# Patient Record
Sex: FEMALE | Race: WHITE | Employment: FULL TIME | URBAN - METROPOLITAN AREA
[De-identification: names, ages, dates, MRNs, and addresses within clinical notes are randomized per-mention and may not be internally consistent; named-entity substitution may affect disease eponyms.]

---

## 2022-07-13 RX ORDER — LEVONORGESTREL AND ETHINYL ESTRADIOL 0.1-0.02MG
KIT ORAL
Qty: 112 TABLET | OUTPATIENT
Start: 2022-07-13

## 2022-09-26 RX ORDER — LEVONORGESTREL AND ETHINYL ESTRADIOL 0.1-0.02MG
KIT ORAL
Qty: 84 TABLET | OUTPATIENT
Start: 2022-09-26

## 2022-09-26 RX ORDER — LEVONORGESTREL AND ETHINYL ESTRADIOL 0.1-0.02MG
1 KIT ORAL DAILY
Qty: 1 PACKET | Refills: 1 | Status: SHIPPED | OUTPATIENT
Start: 2022-09-26

## 2022-11-08 NOTE — PROGRESS NOTES
Patient presents today for a routine gynecological examination with no complaints. Has been on continuous cycle OCP x1 year and notes ever since starting alesse has been having a monthly cycle, but notes is never the same time of the month. Has no predictability whatsoever and has not yet had ceasing of cycle Would like alternate OCP if possible. Denies heavy bleeding. Denies significant pain. Denies pain/bleeding with intercourse. OB History          0    Para        Term   0       0    AB   0    Living   0         SAB        IAB        Ectopic        Molar        Multiple        Live Births                      GYN History     Last pap: 21 Neg/HPV Neg      Patient's last menstrual period was 10/01/2022 (exact date). Continuous OCP x 1 year, periods are random but every month.   positive dysmenorrhea; trace positive postcoital bleeding    Past Medical History:  Past Medical History:   Diagnosis Date    Uterine fibroid        Past Surgical History:  History reviewed. No pertinent surgical history. Allergies:   No Known Allergies    Medication History:  Current Outpatient Medications   Medication Sig Dispense Refill    norgestimate-ethinyl estradiol (ORTHO-CYCLEN, 28,) 0.25-35 MG-MCG per tablet Take 1 tablet by mouth daily Continuous cycle 4 packet 3    levonorgestrel-ethinyl estradiol (AVIANE;ALESSE;LESSINA) 0.1-20 MG-MCG per tablet Take 1 tablet by mouth daily 1 packet 1     No current facility-administered medications for this visit.        Social History:  Social History     Socioeconomic History    Marital status: Single     Spouse name: Not on file    Number of children: Not on file    Years of education: Not on file    Highest education level: Not on file   Occupational History    Not on file   Tobacco Use    Smoking status: Never    Smokeless tobacco: Never   Substance and Sexual Activity    Alcohol use: Yes    Drug use: Never    Sexual activity: Yes     Partners: Male     Birth control/protection: Pill   Other Topics Concern    Not on file   Social History Narrative    Not on file     Social Determinants of Health     Financial Resource Strain: Not on file   Food Insecurity: Not on file   Transportation Needs: Not on file   Physical Activity: Not on file   Stress: Not on file   Social Connections: Not on file   Intimate Partner Violence: Not on file   Housing Stability: Not on file       Family History:  No family history on file. Review of Systems - General ROS: negative except for that discussed in HPI      ROS:  Feeling well. No dyspnea or chest pain on exertion. No abdominal pain, change in bowel habits, black or bloody stools. No urinary tract symptoms. No neurological complaints. Objective:   /76   Ht 5' 8\" (1.727 m)   Wt 215 lb (97.5 kg)   LMP 10/01/2022 (Exact Date)   BMI 32.69 kg/m²   The patient appears well, alert, oriented x 3, in no distress. ENT normal.  Neck supple. No adenopathy or thyromegaly. Lungs:  clear, good air entry, no wheezes, rhonchi or rales. Heart:  S1 and S2 normal, no murmurs, regular rate and rhythm. Abdomen:  soft without tenderness, guarding, mass or organomegaly. Extremities show no edema, normal peripheral pulses. Neurological is normal, no focal findings. BREAST EXAM: breasts appear normal, no suspicious masses, no skin or nipple changes or axillary nodes, symmetric fibrous changes bilaterally    PELVIC EXAM: VULVA: normal appearing vulva with no masses, tenderness or lesions, VAGINA: normal appearing vagina with normal color and discharge, no lesions, CERVIX: normal appearing cervix without discharge or lesions, UTERUS: uterus is normal size, shape, consistency and nontender, ADNEXA: normal adnexa in size, nontender and no masses    Assessment/Plan:     1. Well woman exam    - AMB POC URINALYSIS DIP STICK AUTO W/O MICRO  - norgestimate-ethinyl estradiol (ORTHO-CYCLEN, 28,) 0.25-35 MG-MCG per tablet;  Take 1 tablet by mouth daily Continuous cycle  Dispense: 4 packet; Refill: 3    2. Screening for genitourinary condition    - AMB POC URINALYSIS DIP STICK AUTO W/O MICRO    3. Irregular menses    - norgestimate-ethinyl estradiol (ORTHO-CYCLEN, 28,) 0.25-35 MG-MCG per tablet; Take 1 tablet by mouth daily Continuous cycle  Dispense: 4 packet; Refill: 3     No pap smear per guidelines   Alesse to sprintec- discussed 3-6mo to reach max efficacy  If no improvements in bleeding after change in OCP- US with visit  Pt happy with plan   return annually or prn    Supervising physician is Dr. Walker Gosselin.

## 2022-11-09 ENCOUNTER — OFFICE VISIT (OUTPATIENT)
Dept: OBGYN CLINIC | Age: 32
End: 2022-11-09
Payer: COMMERCIAL

## 2022-11-09 VITALS
WEIGHT: 215 LBS | BODY MASS INDEX: 32.58 KG/M2 | DIASTOLIC BLOOD PRESSURE: 76 MMHG | SYSTOLIC BLOOD PRESSURE: 124 MMHG | HEIGHT: 68 IN

## 2022-11-09 DIAGNOSIS — N92.6 IRREGULAR MENSES: ICD-10-CM

## 2022-11-09 DIAGNOSIS — Z13.89 SCREENING FOR GENITOURINARY CONDITION: ICD-10-CM

## 2022-11-09 DIAGNOSIS — Z01.419 WELL WOMAN EXAM: Primary | ICD-10-CM

## 2022-11-09 LAB
BILIRUBIN, URINE, POC: NEGATIVE
BLOOD URINE, POC: NORMAL
GLUCOSE URINE, POC: NEGATIVE
KETONES, URINE, POC: NORMAL
LEUKOCYTE ESTERASE, URINE, POC: NORMAL
NITRITE, URINE, POC: NEGATIVE
PH, URINE, POC: 6 (ref 4.6–8)
PROTEIN,URINE, POC: NEGATIVE
SPECIFIC GRAVITY, URINE, POC: 1.03 (ref 1–1.03)
URINALYSIS CLARITY, POC: CLEAR
URINALYSIS COLOR, POC: YELLOW
UROBILINOGEN, POC: NORMAL

## 2022-11-09 PROCEDURE — 81003 URINALYSIS AUTO W/O SCOPE: CPT | Performed by: NURSE PRACTITIONER

## 2022-11-09 PROCEDURE — 99395 PREV VISIT EST AGE 18-39: CPT | Performed by: NURSE PRACTITIONER

## 2022-11-09 RX ORDER — NORGESTIMATE AND ETHINYL ESTRADIOL 0.25-0.035
1 KIT ORAL DAILY
Qty: 4 PACKET | Refills: 3 | Status: SHIPPED | OUTPATIENT
Start: 2022-11-09

## 2023-05-11 ENCOUNTER — OFFICE VISIT (OUTPATIENT)
Dept: OBGYN CLINIC | Age: 33
End: 2023-05-11
Payer: COMMERCIAL

## 2023-05-11 VITALS
DIASTOLIC BLOOD PRESSURE: 76 MMHG | WEIGHT: 222.8 LBS | SYSTOLIC BLOOD PRESSURE: 118 MMHG | BODY MASS INDEX: 33.77 KG/M2 | HEIGHT: 68 IN

## 2023-05-11 DIAGNOSIS — E55.9 VITAMIN D DEFICIENCY: ICD-10-CM

## 2023-05-11 DIAGNOSIS — Z13.29 THYROID DISORDER SCREEN: ICD-10-CM

## 2023-05-11 DIAGNOSIS — D25.9 UTERINE LEIOMYOMA, UNSPECIFIED LOCATION: ICD-10-CM

## 2023-05-11 DIAGNOSIS — N92.1 IRREGULAR INTERMENSTRUAL BLEEDING: Primary | ICD-10-CM

## 2023-05-11 PROCEDURE — 99214 OFFICE O/P EST MOD 30 MIN: CPT | Performed by: NURSE PRACTITIONER

## 2023-05-11 NOTE — PROGRESS NOTES
The patient is a 28 y.o. No obstetric history on file. who is seen to discuss AUB. H/o uterine fibroids and has been on a continuous cycle OCP for a few years. She states she started taking the sprintec around November 21st. States has not has worsening bleeding patterns with sprintec. Has seen improvements in flow and pain with cycles, just notes she has some form of spotting all the time. With continuous cycling never has \"normal period like flow\" or heavy bleeding, just frequent spotting. Denies pain/bleeding with intercourse. Currently has no children and is undecided as to weather or not she wants kids in the future. Ultrasound findings from November 2021:  CX appears WNL  Uterus is anteverted and heterogeneous with fibroids, 4 largest measured   F1: left subserosal  F2: mid posterior subserosal  F3: anterior subserosal  F4: anterior right subserosal  ENDO= 4.1mm, no intracavitary masses visualized, however minimal simple free fluid seen in ENDO cavity  ROV is visualized with follicles and appears WNL  LOV is suboptimally visualized due to large left sided fibroid, left ovary possibly closely adjacent vs attached to posterior uterus  Bilateral ADN appear WNL      HISTORY:    No obstetric history on file. Patient's last menstrual period was 03/30/2023 (exact date). Sexual History:  has sex with males  Contraception:  OCP (estrogen/progesterone)  Current Outpatient Medications on File Prior to Visit   Medication Sig Dispense Refill    norgestimate-ethinyl estradiol (ORTHO-CYCLEN, 28,) 0.25-35 MG-MCG per tablet Take 1 tablet by mouth daily Continuous cycle 4 packet 3     No current facility-administered medications on file prior to visit. ROS:  Feeling well. No dyspnea or chest pain on exertion. No abdominal pain, change in bowel habits, black or bloody stools. No urinary tract symptoms. GYN ROS: see above.     PHYSICAL EXAM:  Blood pressure 118/76, height 5' 8\" (1.727 m), weight 222 lb 12.8 oz

## 2023-05-23 ENCOUNTER — OFFICE VISIT (OUTPATIENT)
Dept: OBGYN CLINIC | Age: 33
End: 2023-05-23
Payer: COMMERCIAL

## 2023-05-23 VITALS — BODY MASS INDEX: 33.25 KG/M2 | WEIGHT: 219.4 LBS | HEIGHT: 68 IN

## 2023-05-23 DIAGNOSIS — N93.9 ABNORMAL UTERINE BLEEDING (AUB): ICD-10-CM

## 2023-05-23 DIAGNOSIS — D21.9 FIBROIDS: ICD-10-CM

## 2023-05-23 DIAGNOSIS — Z30.41 SURVEILLANCE FOR BIRTH CONTROL, ORAL CONTRACEPTIVES: Primary | ICD-10-CM

## 2023-05-23 PROCEDURE — 99214 OFFICE O/P EST MOD 30 MIN: CPT | Performed by: NURSE PRACTITIONER

## 2023-05-23 PROCEDURE — 76856 US EXAM PELVIC COMPLETE: CPT | Performed by: NURSE PRACTITIONER

## 2023-05-23 RX ORDER — DROSPIRENONE 4 MG/1
1 TABLET, FILM COATED ORAL DAILY
Qty: 90 TABLET | Refills: 3 | Status: SHIPPED | OUTPATIENT
Start: 2023-05-23

## 2023-05-23 RX ORDER — RELUGOLIX, ESTRADIOL HEMIHYDRATE, AND NORETHINDRONE ACETATE 40; 1; .5 MG/1; MG/1; MG/1
TABLET, FILM COATED ORAL
Qty: 30 TABLET | Refills: 11 | Status: SHIPPED | OUTPATIENT
Start: 2023-05-23

## 2023-05-23 NOTE — PROGRESS NOTES
GYN US
90 tablet; Refill: 3  - Relugolix-Estradiol-Norethind (MYFEMBREE) 40-1-0.5 MG TABS; Take one tablet by mouth daily. Dispense: 30 tablet; Refill: 11    3. Surveillance for birth control, oral contraceptives    - Drospirenone (SLYND) 4 MG TABS; Take 1 tablet by mouth daily  Dispense: 90 tablet; Refill: 1        Supervising physician is Dr. Raj Negron  Greater than 50% of the 30 minute visit were spent in counseling to the above topics.

## 2023-05-30 ENCOUNTER — TELEPHONE (OUTPATIENT)
Dept: OBGYN CLINIC | Age: 33
End: 2023-05-30

## 2023-05-30 DIAGNOSIS — D21.9 FIBROIDS: ICD-10-CM

## 2023-05-30 DIAGNOSIS — N93.9 ABNORMAL UTERINE BLEEDING (AUB): Primary | ICD-10-CM

## 2023-05-30 RX ORDER — RELUGOLIX, ESTRADIOL HEMIHYDRATE, AND NORETHINDRONE ACETATE 40; 1; .5 MG/1; MG/1; MG/1
TABLET, FILM COATED ORAL
Qty: 30 TABLET | Refills: 11 | Status: SHIPPED | OUTPATIENT
Start: 2023-05-30

## 2023-05-30 NOTE — TELEPHONE ENCOUNTER
----- Message from Michaela Venegas RN sent at 5/30/2023  9:18 AM EDT -----  Regarding: FW: Myfembree prescription not filled   Contact: [phone]    ----- Message -----  From: Ernestine Gray  Sent: 5/30/2023   9:05 AM EDT  To: , #  Subject: Myfembree prescription not filled                I received a call from the specialty pharmacy that the [facility] prescription was sent to and they said they dont have it in stock and dont know when they'll receive another shipment. I tried calling pharmacies around me and no one is able to fill or order this medication. I did receive my slynd prescription but did not want to start taking that without the myfembree, so as of right now I've still been taking my current medication. Is there another specialty pharmacy that can get myfembree filled?

## 2023-05-30 NOTE — TELEPHONE ENCOUNTER
Patient sends below message about an alternative specialty pharmacy for [facility] because it is out of stock at the pharmacy we sent it to.

## 2023-06-07 ENCOUNTER — NURSE ONLY (OUTPATIENT)
Dept: OBGYN CLINIC | Age: 33
End: 2023-06-07

## 2023-06-07 DIAGNOSIS — E55.9 VITAMIN D DEFICIENCY: ICD-10-CM

## 2023-06-07 DIAGNOSIS — N92.1 IRREGULAR INTERMENSTRUAL BLEEDING: ICD-10-CM

## 2023-06-07 DIAGNOSIS — Z13.29 THYROID DISORDER SCREEN: ICD-10-CM

## 2023-06-08 ENCOUNTER — TELEPHONE (OUTPATIENT)
Dept: OBGYN CLINIC | Age: 33
End: 2023-06-08

## 2023-06-08 DIAGNOSIS — E55.9 VITAMIN D DEFICIENCY: Primary | ICD-10-CM

## 2023-06-08 LAB
25(OH)D3 SERPL-MCNC: 16.7 NG/ML (ref 30–100)
T4 FREE SERPL-MCNC: 1.2 NG/DL (ref 0.78–1.46)
TSH, 3RD GENERATION: 1.65 UIU/ML (ref 0.36–3.74)

## 2023-06-08 NOTE — TELEPHONE ENCOUNTER
----- Message from NENO Reyes CNP sent at 6/8/2023  7:19 AM EDT -----  Vit D low-50,000iu weekly x16 weeks then 2000 daily for maintenance

## 2023-06-08 NOTE — TELEPHONE ENCOUNTER
Patient calls about not being able to get myfembree. Sent her a my-chart message explaining that I would ask what other pharmacies accept myfembree. Do you know another pharmacy?

## 2023-06-09 RX ORDER — ERGOCALCIFEROL 1.25 MG/1
50000 CAPSULE ORAL WEEKLY
Qty: 8 CAPSULE | Refills: 1 | Status: SHIPPED | OUTPATIENT
Start: 2023-06-09

## 2023-09-24 DIAGNOSIS — E55.9 VITAMIN D DEFICIENCY: ICD-10-CM

## 2023-09-25 RX ORDER — ERGOCALCIFEROL 1.25 MG/1
50000 CAPSULE ORAL WEEKLY
Qty: 8 CAPSULE | Refills: 1 | OUTPATIENT
Start: 2023-09-25